# Patient Record
Sex: FEMALE | URBAN - METROPOLITAN AREA
[De-identification: names, ages, dates, MRNs, and addresses within clinical notes are randomized per-mention and may not be internally consistent; named-entity substitution may affect disease eponyms.]

---

## 2022-07-13 ENCOUNTER — APPOINTMENT (RX ONLY)
Dept: URBAN - METROPOLITAN AREA CLINIC 80 | Facility: CLINIC | Age: 62
Setting detail: DERMATOLOGY
End: 2022-07-13

## 2022-07-13 DIAGNOSIS — L81.1 CHLOASMA: ICD-10-CM

## 2022-07-13 DIAGNOSIS — L91.8 OTHER HYPERTROPHIC DISORDERS OF THE SKIN: ICD-10-CM

## 2022-07-13 DIAGNOSIS — L82.1 OTHER SEBORRHEIC KERATOSIS: ICD-10-CM

## 2022-07-13 PROCEDURE — ? COUNSELING

## 2022-07-13 PROCEDURE — ? PRESCRIPTION MEDICATION MANAGEMENT

## 2022-07-13 PROCEDURE — ? PRESCRIPTION

## 2022-07-13 PROCEDURE — 99204 OFFICE O/P NEW MOD 45 MIN: CPT

## 2022-07-13 RX ORDER — PHARMACY COMPOUNDING ACCESSORY
EACH MISCELLANEOUS
Qty: 30 | Refills: 2 | Status: ERX | COMMUNITY
Start: 2022-07-13

## 2022-07-13 RX ADMIN — Medication: at 00:00

## 2022-07-13 ASSESSMENT — LOCATION SIMPLE DESCRIPTION DERM
LOCATION SIMPLE: CHEST
LOCATION SIMPLE: RIGHT CHEEK
LOCATION SIMPLE: LEFT CHEEK

## 2022-07-13 ASSESSMENT — LOCATION DETAILED DESCRIPTION DERM
LOCATION DETAILED: LEFT LATERAL SUPERIOR CHEST
LOCATION DETAILED: RIGHT CENTRAL MALAR CHEEK
LOCATION DETAILED: LEFT INFERIOR MEDIAL MALAR CHEEK

## 2022-07-13 ASSESSMENT — LOCATION ZONE DERM
LOCATION ZONE: TRUNK
LOCATION ZONE: FACE

## 2022-07-13 ASSESSMENT — SEVERITY ASSESSMENT: SEVERITY: MILD, SLIGHTLY DARKER THAN THE SURROUNDING NORMAL SKIN

## 2022-07-13 NOTE — PROCEDURE: PRESCRIPTION MEDICATION MANAGEMENT
Render In Strict Bullet Format?: No
Plan: Recommended sunscreen with zinc and importance of using sunscreen every day.\\nDiscussed Hydroquinone compound t be used in winter months, pt voiced understand
Detail Level: Zone

## 2024-04-10 ENCOUNTER — APPOINTMENT (RX ONLY)
Dept: URBAN - METROPOLITAN AREA CLINIC 80 | Facility: CLINIC | Age: 64
Setting detail: DERMATOLOGY
End: 2024-04-10

## 2024-04-10 DIAGNOSIS — L81.1 CHLOASMA: ICD-10-CM

## 2024-04-10 DIAGNOSIS — L64.8 OTHER ANDROGENIC ALOPECIA: ICD-10-CM

## 2024-04-10 DIAGNOSIS — L91.8 OTHER HYPERTROPHIC DISORDERS OF THE SKIN: ICD-10-CM

## 2024-04-10 PROCEDURE — ? PRESCRIPTION MEDICATION MANAGEMENT

## 2024-04-10 PROCEDURE — 99214 OFFICE O/P EST MOD 30 MIN: CPT

## 2024-04-10 PROCEDURE — ? PRESCRIPTION

## 2024-04-10 PROCEDURE — ? TREATMENT REGIMEN

## 2024-04-10 PROCEDURE — ? COUNSELING

## 2024-04-10 RX ORDER — PHARMACY COMPOUNDING ACCESSORY
EACH MISCELLANEOUS
Qty: 30 | Refills: 2 | Status: ERX | COMMUNITY
Start: 2024-04-10

## 2024-04-10 RX ADMIN — Medication: at 00:00

## 2024-04-10 ASSESSMENT — SEVERITY ASSESSMENT: SEVERITY: MILD, SLIGHTLY DARKER THAN THE SURROUNDING NORMAL SKIN

## 2024-04-10 ASSESSMENT — LOCATION DETAILED DESCRIPTION DERM
LOCATION DETAILED: RIGHT CENTRAL MALAR CHEEK
LOCATION DETAILED: LEFT CENTRAL MALAR CHEEK

## 2024-04-10 ASSESSMENT — LOCATION SIMPLE DESCRIPTION DERM
LOCATION SIMPLE: RIGHT CHEEK
LOCATION SIMPLE: LEFT CHEEK

## 2024-04-10 ASSESSMENT — LOCATION ZONE DERM: LOCATION ZONE: FACE

## 2024-04-10 NOTE — PROCEDURE: TREATMENT REGIMEN
Plan: Recommended Nutrafol supplement. Discussed information on red hat.
Detail Level: Zone
Otc Regimen: Minoxidil shampoo

## 2024-04-10 NOTE — HPI: HAIR LOSS
Previous Labs: No
How Did The Hair Loss Occur?: gradual in onset
How Severe Is Your Hair Loss?: mild
Additional History: Patient states that she has tried PRP and didn’t see much growth.